# Patient Record
Sex: MALE | Race: AMERICAN INDIAN OR ALASKA NATIVE
[De-identification: names, ages, dates, MRNs, and addresses within clinical notes are randomized per-mention and may not be internally consistent; named-entity substitution may affect disease eponyms.]

---

## 2018-03-09 ENCOUNTER — HOSPITAL ENCOUNTER (EMERGENCY)
Dept: HOSPITAL 5 - ED | Age: 38
LOS: 1 days | Discharge: HOME | End: 2018-03-10
Payer: COMMERCIAL

## 2018-03-09 VITALS — SYSTOLIC BLOOD PRESSURE: 149 MMHG | DIASTOLIC BLOOD PRESSURE: 98 MMHG

## 2018-03-09 DIAGNOSIS — I10: ICD-10-CM

## 2018-03-09 DIAGNOSIS — G89.29: ICD-10-CM

## 2018-03-09 DIAGNOSIS — F17.200: ICD-10-CM

## 2018-03-09 DIAGNOSIS — K08.89: Primary | ICD-10-CM

## 2018-03-09 PROCEDURE — 99282 EMERGENCY DEPT VISIT SF MDM: CPT

## 2018-03-10 NOTE — EMERGENCY DEPARTMENT REPORT
ED ENT HPI





- General


Chief complaint: Dental/Oral


Stated complaint: MOUTH PAIN


Time Seen by Provider: 03/10/18 00:05


Source: patient


Mode of arrival: Ambulatory


Limitations: No Limitations





- History of Present Illness


Initial comments: 





37-year-old -American male comes in today for reporting pain in the left 

side of his mouth/jaw since yesterday.  Patient reports that he is followed by 

[facility] on [facility].  He was last seen at 3 months ago at that 

time he had 2 teeth extracted and was told he needed a root canal.  Patient has 

a past medical history of hypertension he is currently taking amlodipine but 

did not take it today he reports he needs to pick it up.  Patient reports his 

last ibuprofen was about 6-7 hours ago.  He drove himself here to the emergency 

room to be evaluated.


MD complaint: tooth pain


-: days(s) (1)


Location: tooth # (19)


Severity: severe


Severity scale (0 -10): 6


Quality: aching, dull, constant


Consistency: constant


Improves with: NSAID


Associated Symptoms: gum swelling





- Related Data


 Previous Rx's











 Medication  Instructions  Recorded  Last Taken  Type


 


Ibuprofen [Motrin] 800 mg PO Q8H #30 tablet 12/09/14 Unknown Rx


 


methOCARBAMOL [Robaxin] 500 mg PO BID #30 tab 12/09/14 Unknown Rx


 


Clindamycin [Clindamycin CAP] 300 mg PO Q8H 10 Days #30 cap 03/10/18 Unknown Rx


 


Ibuprofen [Motrin 800 MG tab] 800 mg PO Q8H #30 tablet 03/10/18 Unknown Rx


 


traMADol [Ultram 50 MG tab] 50 mg PO Q6HR PRN #20 tablet 03/10/18 Unknown Rx











 Allergies











Allergy/AdvReac Type Severity Reaction Status Date / Time


 


No Known Allergies Allergy   Unverified 08/12/14 08:24














ED Dental HPI





- General


Chief complaint: Dental/Oral


Stated complaint: MOUTH PAIN


Time Seen by Provider: 03/10/18 00:05


Source: patient


Mode of arrival: Ambulatory


Limitations: No Limitations





- Related Data


 Previous Rx's











 Medication  Instructions  Recorded  Last Taken  Type


 


Ibuprofen [Motrin] 800 mg PO Q8H #30 tablet 12/09/14 Unknown Rx


 


methOCARBAMOL [Robaxin] 500 mg PO BID #30 tab 12/09/14 Unknown Rx


 


Clindamycin [Clindamycin CAP] 300 mg PO Q8H 10 Days #30 cap 03/10/18 Unknown Rx


 


Ibuprofen [Motrin 800 MG tab] 800 mg PO Q8H #30 tablet 03/10/18 Unknown Rx


 


traMADol [Ultram 50 MG tab] 50 mg PO Q6HR PRN #20 tablet 03/10/18 Unknown Rx











 Allergies











Allergy/AdvReac Type Severity Reaction Status Date / Time


 


No Known Allergies Allergy   Unverified 08/12/14 08:24














ED Review of Systems


ROS: 


Stated complaint: MOUTH PAIN


Other details as noted in HPI





Constitutional: denies: chills, fever


Eyes: as per HPI


ENT: dental pain


Respiratory: denies: cough, shortness of breath, wheezing


Cardiovascular: denies: chest pain, palpitations


Endocrine: no symptoms reported





ED Past Medical Hx





- Past Medical History


Previous Medical History?: Yes


Hx Hypertension: Yes





- Surgical History


Past Surgical History?: Yes


Hx Appendectomy: Yes





- Social History


Smoking Status: Current Every Day Smoker





- Medications


Home Medications: 


 Home Medications











 Medication  Instructions  Recorded  Confirmed  Last Taken  Type


 


Ibuprofen [Motrin] 800 mg PO Q8H #30 tablet 12/09/14  Unknown Rx


 


methOCARBAMOL [Robaxin] 500 mg PO BID #30 tab 12/09/14  Unknown Rx


 


Clindamycin [Clindamycin CAP] 300 mg PO Q8H 10 Days #30 cap 03/10/18  Unknown Rx


 


Ibuprofen [Motrin 800 MG tab] 800 mg PO Q8H #30 tablet 03/10/18  Unknown Rx


 


traMADol [Ultram 50 MG tab] 50 mg PO Q6HR PRN #20 tablet 03/10/18  Unknown Rx














ED Physical Exam





- General


Limitations: No Limitations


General appearance: alert, in no apparent distress





- Head


Head exam: Present: atraumatic, normocephalic





- Eye


Eye exam: Present: normal appearance





- ENT


ENT exam: Present: mucous membranes moist





- Expanded ENT Exam


  ** Expanded


Teeth exam: Present: dental caries (19), dental tenderness # (19), gingival 

enlargement, other (left lower jaw swelling)


Throat exam: Positive: normal inspection





- Respiratory


Respiratory exam: Present: normal lung sounds bilaterally.  Absent: respiratory 

distress





- Cardiovascular


Cardiovascular Exam: Present: regular rate, normal rhythm.  Absent: systolic 

murmur, diastolic murmur, rubs, gallop





ED Course





 Vital Signs











  03/09/18 03/09/18 03/09/18





  19:25 19:27 19:37


 


Temperature 98.6 F 98.6 F 


 


Pulse Rate 92 H 92 H 92 H


 


Respiratory 16  





Rate   


 


Blood Pressure  180/106 180/106


 


Blood Pressure 180/106  





[Right]   


 


O2 Sat by Pulse 100 100 





Oximetry   














  03/09/18





  20:50


 


Temperature 


 


Pulse Rate 74


 


Respiratory 





Rate 


 


Blood Pressure 


 


Blood Pressure 149/98





[Right] 


 


O2 Sat by Pulse 





Oximetry 














ED Medical Decision Making





- Medical Decision Making





Patient's been evaluated by this provider fast track.  I will give patient 

ibuprofen 800 mg now.  I will place patient on clindamycin 300 mg 1 tablet by 

mouth 3 times a day for 10 days.  Patient needs to follow-up with his dentist 

and [facility] on [facility].  Patient verbalized understanding


Critical care attestation.: 


If time is entered above; I have spent that time in minutes in the direct care 

of this critically ill patient, excluding procedure time.








ED Disposition


Clinical Impression: 


 Chronic dental pain





Disposition: DC-01 TO HOME OR SELFCARE


Is pt being admited?: No


Does the pt Need Aspirin: No


Condition: Stable


Instructions:  Dental Abscess (ED)


Additional Instructions: 


Complete antibiotics as prescribed.  Take pain medication as needed.  Follow-up 

with your dentist in the next 5-7 days.  He can apply ice pack to your left 

lower jaw.


Prescriptions: 


Clindamycin [Clindamycin CAP] 300 mg PO Q8H 10 Days #30 cap


Ibuprofen [Motrin 800 MG tab] 800 mg PO Q8H #30 tablet


traMADol [Ultram 50 MG tab] 50 mg PO Q6HR PRN #20 tablet


 PRN Reason: Pain


Referrals: 


TERRY GRAMAJO MD [Primary Care Provider] - 3-5 Days


Forms:  Work/School Release Form(ED)